# Patient Record
Sex: FEMALE | Race: BLACK OR AFRICAN AMERICAN | URBAN - METROPOLITAN AREA
[De-identification: names, ages, dates, MRNs, and addresses within clinical notes are randomized per-mention and may not be internally consistent; named-entity substitution may affect disease eponyms.]

---

## 2018-01-04 ENCOUNTER — IMPORTED ENCOUNTER (OUTPATIENT)
Dept: URBAN - METROPOLITAN AREA CLINIC 50 | Facility: CLINIC | Age: 43
End: 2018-01-04

## 2018-01-15 ENCOUNTER — IMPORTED ENCOUNTER (OUTPATIENT)
Dept: URBAN - METROPOLITAN AREA CLINIC 50 | Facility: CLINIC | Age: 43
End: 2018-01-15

## 2021-04-18 ASSESSMENT — VISUAL ACUITY
OS_CC: J1+
OD_CC: J1+
OS_CC: 20/20-
OD_CC: 20/20-

## 2021-04-18 ASSESSMENT — TONOMETRY
OS_IOP_MMHG: 12
OD_IOP_MMHG: 12

## 2021-12-09 ENCOUNTER — PREPPED CHART (OUTPATIENT)
Dept: URBAN - METROPOLITAN AREA CLINIC 53 | Facility: CLINIC | Age: 46
End: 2021-12-09

## 2021-12-10 ENCOUNTER — COMPREHENSIVE EXAM (OUTPATIENT)
Dept: URBAN - METROPOLITAN AREA CLINIC 53 | Facility: CLINIC | Age: 46
End: 2021-12-10

## 2021-12-10 DIAGNOSIS — H35.63: ICD-10-CM

## 2021-12-10 DIAGNOSIS — Z01.00: ICD-10-CM

## 2021-12-10 DIAGNOSIS — H52.4: ICD-10-CM

## 2021-12-10 PROCEDURE — 92014 COMPRE OPH EXAM EST PT 1/>: CPT

## 2021-12-10 PROCEDURE — 92015 DETERMINE REFRACTIVE STATE: CPT

## 2021-12-10 ASSESSMENT — VISUAL ACUITY
OU_CC: 20/20
OD_CC: 20/25
OU_SC: J1+
OS_CC: 20/25+1

## 2021-12-10 ASSESSMENT — TONOMETRY
OS_IOP_MMHG: 19
OD_IOP_MMHG: 19

## 2021-12-10 NOTE — PATIENT DISCUSSION
Fine scattered DBH, OU. Will send letter to PCP. Will request patient be seen for a physical and for lab work to be completed that includes fasting blood sugar and A1c.

## 2021-12-10 NOTE — PATIENT DISCUSSION
Patient given Rx for glasses. Advised patient to hold on filling glasses Rx until lab work has been completed by PCP.

## 2023-07-28 ENCOUNTER — COMPREHENSIVE EXAM (OUTPATIENT)
Dept: URBAN - METROPOLITAN AREA CLINIC 49 | Facility: CLINIC | Age: 48
End: 2023-07-28

## 2023-07-28 DIAGNOSIS — Z01.00: ICD-10-CM

## 2023-07-28 DIAGNOSIS — H25.12: ICD-10-CM

## 2023-07-28 DIAGNOSIS — H25.811: ICD-10-CM

## 2023-07-28 DIAGNOSIS — H35.63: ICD-10-CM

## 2023-07-28 DIAGNOSIS — H52.13: ICD-10-CM

## 2023-07-28 DIAGNOSIS — E11.9: ICD-10-CM

## 2023-07-28 PROCEDURE — 92014 COMPRE OPH EXAM EST PT 1/>: CPT

## 2023-07-28 PROCEDURE — 92015 DETERMINE REFRACTIVE STATE: CPT

## 2023-07-28 ASSESSMENT — VISUAL ACUITY
OD_CC: 20/25-1
OU_CC: 20/25
OS_GLARE: 20/30
OS_CC: 20/30
OD_GLARE: 20/30
OS_PH: 20/20
OS_GLARE: 20/30
OD_GLARE: 20/30

## 2023-07-28 ASSESSMENT — TONOMETRY
OD_IOP_MMHG: 17
OS_IOP_MMHG: 19